# Patient Record
Sex: MALE | Race: WHITE | NOT HISPANIC OR LATINO | Employment: FULL TIME | URBAN - NONMETROPOLITAN AREA
[De-identification: names, ages, dates, MRNs, and addresses within clinical notes are randomized per-mention and may not be internally consistent; named-entity substitution may affect disease eponyms.]

---

## 2022-04-13 ENCOUNTER — OUTSIDE FACILITY SERVICE (OUTPATIENT)
Dept: CARDIOLOGY | Facility: CLINIC | Age: 32
End: 2022-04-13

## 2022-04-13 PROCEDURE — 93248 EXT ECG>7D<15D REV&INTERPJ: CPT | Performed by: INTERNAL MEDICINE

## 2022-04-19 ENCOUNTER — OFFICE VISIT (OUTPATIENT)
Dept: CARDIOLOGY | Facility: CLINIC | Age: 32
End: 2022-04-19

## 2022-04-19 VITALS
HEART RATE: 76 BPM | WEIGHT: 284 LBS | BODY MASS INDEX: 37.64 KG/M2 | SYSTOLIC BLOOD PRESSURE: 150 MMHG | HEIGHT: 73 IN | DIASTOLIC BLOOD PRESSURE: 92 MMHG

## 2022-04-19 DIAGNOSIS — R01.1 MURMUR, CARDIAC: ICD-10-CM

## 2022-04-19 DIAGNOSIS — E83.42 HYPOMAGNESEMIA: ICD-10-CM

## 2022-04-19 DIAGNOSIS — R00.0 WIDE-COMPLEX TACHYCARDIA: ICD-10-CM

## 2022-04-19 DIAGNOSIS — I10 PRIMARY HYPERTENSION: ICD-10-CM

## 2022-04-19 DIAGNOSIS — R00.2 PALPITATIONS: Primary | ICD-10-CM

## 2022-04-19 DIAGNOSIS — E88.81 METABOLIC SYNDROME: ICD-10-CM

## 2022-04-19 DIAGNOSIS — F41.0 PANIC ATTACK: ICD-10-CM

## 2022-04-19 PROBLEM — E88.810 METABOLIC SYNDROME: Status: ACTIVE | Noted: 2022-04-19

## 2022-04-19 PROCEDURE — 93000 ELECTROCARDIOGRAM COMPLETE: CPT | Performed by: INTERNAL MEDICINE

## 2022-04-19 PROCEDURE — 99204 OFFICE O/P NEW MOD 45 MIN: CPT | Performed by: INTERNAL MEDICINE

## 2022-04-19 RX ORDER — SACCHAROMYCES BOULARDII 250 MG
250 CAPSULE ORAL DAILY
COMMUNITY

## 2022-04-19 RX ORDER — CETIRIZINE HYDROCHLORIDE 10 MG/1
10 TABLET ORAL DAILY
COMMUNITY

## 2022-04-19 RX ORDER — METOPROLOL SUCCINATE 50 MG/1
50 TABLET, EXTENDED RELEASE ORAL DAILY
Qty: 30 TABLET | Refills: 11 | Status: SHIPPED | OUTPATIENT
Start: 2022-04-19 | End: 2022-11-01 | Stop reason: SDUPTHER

## 2022-04-19 RX ORDER — LOSARTAN POTASSIUM AND HYDROCHLOROTHIAZIDE 12.5; 1 MG/1; MG/1
1 TABLET ORAL DAILY
COMMUNITY
End: 2022-04-19

## 2022-04-19 NOTE — PROGRESS NOTES
Chief Complaint   Patient presents with   • Establish Care     PCP referred, palpitations, abnormal holter.    • Palpitations     Started in Dec, felt like he was having panic attacks, went to see PCP. BP elevated. Started medication that caused him to cough, changed it to losartan/ HCT. Palpitations started at the panic attack but was much better until he started on Losartan HCT, much worse since having to increase it to try to control BP. Palpitations now occurring 3-4 times a week, at times will last on and off up to 2 hrs. Worse day was the first day he wore the monitor, BP and heart rate went up  that day.    • Medication Problem     Problems sleeping and more palpitations with the Losartan, would like for it to be changed   • Cardiac history     None          CARDIAC COMPLAINTS  palpitations      Subjective   Vikash Grove is a 32 y.o. male came in today for his initial cardiac evaluation.  In December he started having symptoms suggestive of panic attacks.  He was noted to have an elevated blood pressure for which he was started on lisinopril but it made him cough.  It was replaced to losartan after which his blood pressure got little better but the palpitation started getting worse.  He is blood pressure then started going up because he was getting very anxious and the dose was increased.  Since then he started noticing increasing the frequency of the palpitation which occurs 3-4 times a week and sometimes it lasts about 2 hours.  He did wore event monitor and on the day 1 he did have 7 beats run of wide-complex tachycardia.  He is now referred for further evaluation.  He denies having any chest pain.  He does feel weak when he has the palpitation.  He denies having any loss of consciousness.  He did undergo lab work and the cholesterol is 161 with the LDL of 94.  He is not sure whether he had any other labs done at that time.  He used to drink a lot of iced tea in the past which he reduced.  He has no  "history of smoking.  He drinks occasional alcohol.  His mother has hypertension, hypercholesterolemia and some kind of valvular heart disease.    Past Surgical History:   Procedure Laterality Date   • OTHER SURGICAL HISTORY  04/13/2022    Event Monitor @ [facility]. 13 days. Avg 78. [id number]. One 7 Beats Wide complex Tachycardia       Current Outpatient Medications   Medication Sig Dispense Refill   • cetirizine (zyrTEC) 10 MG tablet Take 10 mg by mouth Daily.     • saccharomyces boulardii (FLORASTOR) 250 MG capsule Take 250 mg by mouth Daily.     • sertraline (ZOLOFT) 50 MG tablet Take 50 mg by mouth Daily.     • metoprolol succinate XL (TOPROL-XL) 50 MG 24 hr tablet Take 1 tablet by mouth Daily. 30 tablet 11     No current facility-administered medications for this visit.           ALLERGIES:  Goldenrod, Lisinopril, and Penicillins    Past Medical History:   Diagnosis Date   • Anxiety    • Hyperlipidemia    • Seasonal allergies        Social History     Tobacco Use   Smoking Status Never Smoker   Smokeless Tobacco Never Used          Family History   Problem Relation Age of Onset   • Hyperlipidemia Mother    • Hypertension Mother    • Heart disease Mother    • Heart valve disorder Mother    • Other Mother         \"hole in her heart repaired\"   • No Known Problems Father    • Other Sister         tachycardia   • Hypertension Brother    • Other Other         grandparents medical history unknown       Review of Systems   Constitutional: Positive for weight loss. Negative for decreased appetite and malaise/fatigue.   HENT: Negative for congestion and sore throat.    Eyes: Negative for blurred vision.   Cardiovascular: Positive for palpitations. Negative for chest pain.   Respiratory: Negative for shortness of breath and snoring.    Endocrine: Negative for cold intolerance and heat intolerance.   Hematologic/Lymphatic: Negative for adenopathy. Does not bruise/bleed easily.   Skin: Negative for itching, nail changes and skin " "cancer.   Musculoskeletal: Negative for arthritis and myalgias.   Gastrointestinal: Negative for abdominal pain, dysphagia and heartburn.   Genitourinary: Negative for bladder incontinence and frequency.   Neurological: Negative for dizziness, light-headedness, seizures and vertigo.   Psychiatric/Behavioral: Negative for altered mental status. The patient is nervous/anxious.    Allergic/Immunologic: Negative for environmental allergies and hives.       Diabetes- No  Thyroid- normal    Objective     /92 (BP Location: Left arm)   Pulse 76   Ht 185.4 cm (73\")   Wt 129 kg (284 lb)   BMI 37.47 kg/m²     Vitals and nursing note reviewed.   Constitutional:       Appearance: Healthy appearance. Not in distress.   Eyes:      Conjunctiva/sclera: Conjunctivae normal.      Pupils: Pupils are equal, round, and reactive to light.   HENT:      Head: Normocephalic.   Pulmonary:      Effort: Pulmonary effort is normal.      Breath sounds: Normal breath sounds.   Cardiovascular:      Normal rate. Regular rhythm.      Murmurs: There is a systolic murmur.   Abdominal:      General: Bowel sounds are normal.      Palpations: Abdomen is soft.   Musculoskeletal: Normal range of motion.      Cervical back: Normal range of motion and neck supple. Skin:     General: Skin is warm and dry.   Neurological:      Mental Status: Alert, oriented to person, place, and time and oriented to person, place and time.           ECG 12 Lead    Date/Time: 4/19/2022 3:58 PM  Performed by: Sussy Travis MD  Authorized by: Sussy Travis MD   Comparison: compared with previous ECG from 3/19/2022  Similar to previous ECG  Rhythm: sinus rhythm  Rate: normal  QRS axis: normal  Other findings: T wave abnormality and left ventricular hypertrophy    Clinical impression: non-specific ECG              Assessment/Plan   Patient's Body mass index is 37.47 kg/m². indicating that he is obese (BMI >30). Obesity-related health conditions include the " following: hypertension. Obesity is newly identified. BMI is is above average; BMI management plan is completed. We discussed low calorie, low carb based diet program, portion control and increasing exercise..     Diagnoses and all orders for this visit:    1. Palpitations (Primary)  -     metoprolol succinate XL (TOPROL-XL) 50 MG 24 hr tablet; Take 1 tablet by mouth Daily.  Dispense: 30 tablet; Refill: 11  -     Treadmill Stress Test; Future  -     CBC & Differential; Future  -     TSH; Future    2. Primary hypertension  -     metoprolol succinate XL (TOPROL-XL) 50 MG 24 hr tablet; Take 1 tablet by mouth Daily.  Dispense: 30 tablet; Refill: 11  -     Comprehensive Metabolic Panel; Future    3. Wide-complex tachycardia (HCC)  -     metoprolol succinate XL (TOPROL-XL) 50 MG 24 hr tablet; Take 1 tablet by mouth Daily.  Dispense: 30 tablet; Refill: 11  -     Treadmill Stress Test; Future  -     Adult Transthoracic Echo Complete W/ Cont if Necessary Per Protocol; Future    4. Metabolic syndrome    5. Murmur, cardiac  -     Adult Transthoracic Echo Complete W/ Cont if Necessary Per Protocol; Future    6. Hypomagnesemia  -     Magnesium; Future    7. Panic attack    At baseline his heart rate is stable.  His blood pressure is still slightly elevated.  His EKG shows normal sinus rhythm, Q waves in the anterior leads, LVH and nonspecific T wave changes in lead III and aVF.  His clinical examination reveals a BMI of 38.  He does have a soft systolic murmur at the mitral area.  He has normal peripheral pulse and no pedal edema    Regarding the palpitation, I talked to him about the Holter monitor.  I started him on Toprol-XL at 50 mg once a day.  I advised him to stop the Hyzaar for now.  I also advised him to check his electrolytes including magnesium since he is on hydrochlorothiazide.  I also advised him to check his TSH level.  After being on the Toprol-XL, I like him to undergo a regular stress test to evaluate his  functional status, chronotropic response, blood pressure response and also look for any stress-induced arrhythmia.    Regarding his hypertension, he is not able to tolerate the Hyzaar causing him to have more palpitation.  I am not sure whether it is due to the diuretic or whether he is having problems with the ARB.  We will stop it and start him on Toprol-XL.  If during the stress test he has increased chronotropic response we can increase the dose of the Toprol but if he has 1 day hypertensive response then he may need to go on a calcium channel blocker    Regarding the wide-complex tachycardia, I like to get an echocardiogram to look for any structural heart disease including LVH, LV function as well as valvular structures.  Meanwhile hopefully Toprol-XL should help controlling it    Regarding metabolic syndrome, talked to him about diet and weight reduction.  I gave him papers on Mediterranean diet    Regarding his cardiac murmur, it appears to be secondary to mitral regurgitation.  Will review the echocardiogram    Regarding his panic attacks, he is on Zoloft and apparently is doing much better.  Continue to monitor it.    Based on the results, further recommendations will be made.               Electronically signed by Sussy Travis MD April 19, 2022 15:45 EDT

## 2022-04-22 ENCOUNTER — PATIENT ROUNDING (BHMG ONLY) (OUTPATIENT)
Dept: CARDIOLOGY | Facility: CLINIC | Age: 32
End: 2022-04-22

## 2022-04-22 NOTE — PROGRESS NOTES
April 22, 2022    Hello, may I speak with Vikash Grove?    My name is Suni Hinojosa    I am  with [facility]  [facility]  [address] [place] [address] [phone].    Before we get started may I verify your date of birth? 1990    I am calling to officially welcome you to our practice and ask about your recent visit. Is this a good time to talk? yes    Tell me about your visit with us. What things went well?  everything went well       We're always looking for ways to make our patients' experiences even better. Do you have recommendations on ways we may improve?  no    Overall were you satisfied with your first visit to our practice? Yes-everyone was real nice and listened to me        I appreciate you taking the time to speak with me today. Is there anything else I can do for you? no      Thank you, and have a great day.

## 2022-05-10 ENCOUNTER — HOSPITAL ENCOUNTER (OUTPATIENT)
Dept: CARDIOLOGY | Facility: HOSPITAL | Age: 32
Discharge: HOME | End: 2022-05-10

## 2022-05-10 ENCOUNTER — LAB (OUTPATIENT)
Dept: LAB | Facility: HOSPITAL | Age: 32
End: 2022-05-10

## 2022-05-10 DIAGNOSIS — E83.42 HYPOMAGNESEMIA: ICD-10-CM

## 2022-05-10 DIAGNOSIS — R00.0 WIDE-COMPLEX TACHYCARDIA: ICD-10-CM

## 2022-05-10 DIAGNOSIS — R01.1 MURMUR, CARDIAC: ICD-10-CM

## 2022-05-10 DIAGNOSIS — R00.2 PALPITATIONS: ICD-10-CM

## 2022-05-10 DIAGNOSIS — I10 PRIMARY HYPERTENSION: ICD-10-CM

## 2022-05-10 LAB
ALBUMIN SERPL-MCNC: 4.58 G/DL (ref 3.5–5.2)
ALBUMIN/GLOB SERPL: 2.1 G/DL
ALP SERPL-CCNC: 72 U/L (ref 39–117)
ALT SERPL W P-5'-P-CCNC: 18 U/L (ref 1–41)
ANION GAP SERPL CALCULATED.3IONS-SCNC: 13.8 MMOL/L (ref 5–15)
AST SERPL-CCNC: 18 U/L (ref 1–40)
BASOPHILS # BLD AUTO: 0.08 10*3/MM3 (ref 0–0.2)
BASOPHILS NFR BLD AUTO: 0.8 % (ref 0–1.5)
BILIRUB SERPL-MCNC: 1.4 MG/DL (ref 0–1.2)
BUN SERPL-MCNC: 12 MG/DL (ref 6–20)
BUN/CREAT SERPL: 14.6 (ref 7–25)
CALCIUM SPEC-SCNC: 9.8 MG/DL (ref 8.6–10.5)
CHLORIDE SERPL-SCNC: 103 MMOL/L (ref 98–107)
CO2 SERPL-SCNC: 24.2 MMOL/L (ref 22–29)
CREAT SERPL-MCNC: 0.82 MG/DL (ref 0.76–1.27)
DEPRECATED RDW RBC AUTO: 40.3 FL (ref 37–54)
EGFRCR SERPLBLD CKD-EPI 2021: 118.2 ML/MIN/1.73
EOSINOPHIL # BLD AUTO: 0.13 10*3/MM3 (ref 0–0.4)
EOSINOPHIL NFR BLD AUTO: 1.4 % (ref 0.3–6.2)
ERYTHROCYTE [DISTWIDTH] IN BLOOD BY AUTOMATED COUNT: 12.2 % (ref 12.3–15.4)
GLOBULIN UR ELPH-MCNC: 2.2 GM/DL
GLUCOSE SERPL-MCNC: 90 MG/DL (ref 65–99)
HCT VFR BLD AUTO: 48.3 % (ref 37.5–51)
HGB BLD-MCNC: 15.8 G/DL (ref 13–17.7)
IMM GRANULOCYTES # BLD AUTO: 0.04 10*3/MM3 (ref 0–0.05)
IMM GRANULOCYTES NFR BLD AUTO: 0.4 % (ref 0–0.5)
LYMPHOCYTES # BLD AUTO: 2.18 10*3/MM3 (ref 0.7–3.1)
LYMPHOCYTES NFR BLD AUTO: 22.9 % (ref 19.6–45.3)
MAGNESIUM SERPL-MCNC: 2.1 MG/DL (ref 1.6–2.6)
MCH RBC QN AUTO: 29.3 PG (ref 26.6–33)
MCHC RBC AUTO-ENTMCNC: 32.7 G/DL (ref 31.5–35.7)
MCV RBC AUTO: 89.6 FL (ref 79–97)
MONOCYTES # BLD AUTO: 0.87 10*3/MM3 (ref 0.1–0.9)
MONOCYTES NFR BLD AUTO: 9.2 % (ref 5–12)
NEUTROPHILS NFR BLD AUTO: 6.2 10*3/MM3 (ref 1.7–7)
NEUTROPHILS NFR BLD AUTO: 65.3 % (ref 42.7–76)
NRBC BLD AUTO-RTO: 0 /100 WBC (ref 0–0.2)
PLATELET # BLD AUTO: 246 10*3/MM3 (ref 140–450)
PMV BLD AUTO: 10.8 FL (ref 6–12)
POTASSIUM SERPL-SCNC: 4.4 MMOL/L (ref 3.5–5.2)
PROT SERPL-MCNC: 6.8 G/DL (ref 6–8.5)
RBC # BLD AUTO: 5.39 10*6/MM3 (ref 4.14–5.8)
SODIUM SERPL-SCNC: 141 MMOL/L (ref 136–145)
TSH SERPL DL<=0.05 MIU/L-ACNC: 0.86 UIU/ML (ref 0.27–4.2)
WBC NRBC COR # BLD: 9.5 10*3/MM3 (ref 3.4–10.8)

## 2022-05-10 PROCEDURE — 93018 CV STRESS TEST I&R ONLY: CPT | Performed by: INTERNAL MEDICINE

## 2022-05-10 PROCEDURE — 36415 COLL VENOUS BLD VENIPUNCTURE: CPT

## 2022-05-10 PROCEDURE — 93306 TTE W/DOPPLER COMPLETE: CPT | Performed by: INTERNAL MEDICINE

## 2022-05-10 PROCEDURE — 80050 GENERAL HEALTH PANEL: CPT

## 2022-05-10 PROCEDURE — 93306 TTE W/DOPPLER COMPLETE: CPT

## 2022-05-10 PROCEDURE — 83735 ASSAY OF MAGNESIUM: CPT

## 2022-05-10 PROCEDURE — 93017 CV STRESS TEST TRACING ONLY: CPT

## 2022-05-11 LAB
BH CV ECHO MEAS - ACS: 2.3 CM
BH CV ECHO MEAS - AO MAX PG: 8.8 MMHG
BH CV ECHO MEAS - AO MEAN PG: 5.1 MMHG
BH CV ECHO MEAS - AO ROOT DIAM: 2.9 CM
BH CV ECHO MEAS - AO V2 MAX: 148 CM/SEC
BH CV ECHO MEAS - AO V2 VTI: 32.6 CM
BH CV ECHO MEAS - EDV(CUBED): 74.6 ML
BH CV ECHO MEAS - EDV(MOD-SP4): 110 ML
BH CV ECHO MEAS - EF(MOD-SP4): 54.5 %
BH CV ECHO MEAS - EF_3D-VOL: 76 %
BH CV ECHO MEAS - ESV(CUBED): 19.9 ML
BH CV ECHO MEAS - ESV(MOD-SP4): 50 ML
BH CV ECHO MEAS - FS: 35.6 %
BH CV ECHO MEAS - IVS/LVPW: 1 CM
BH CV ECHO MEAS - IVSD: 1.14 CM
BH CV ECHO MEAS - LA DIMENSION: 3.5 CM
BH CV ECHO MEAS - LAT PEAK E' VEL: 11.4 CM/SEC
BH CV ECHO MEAS - LV DIASTOLIC VOL/BSA (35-75): 44 CM2
BH CV ECHO MEAS - LV MASS(C)D: 166 GRAMS
BH CV ECHO MEAS - LV SYSTOLIC VOL/BSA (12-30): 20 CM2
BH CV ECHO MEAS - LVIDD: 4.2 CM
BH CV ECHO MEAS - LVIDS: 2.7 CM
BH CV ECHO MEAS - LVOT AREA: 3.4 CM2
BH CV ECHO MEAS - LVOT DIAM: 2.08 CM
BH CV ECHO MEAS - LVPWD: 1.14 CM
BH CV ECHO MEAS - MED PEAK E' VEL: 9.1 CM/SEC
BH CV ECHO MEAS - MV A MAX VEL: 69 CM/SEC
BH CV ECHO MEAS - MV DEC SLOPE: 440.4 CM/SEC2
BH CV ECHO MEAS - MV E MAX VEL: 106 CM/SEC
BH CV ECHO MEAS - MV E/A: 1.54
BH CV ECHO MEAS - RVDD: 3.5 CM
BH CV ECHO MEAS - SI(MOD-SP4): 24 ML/M2
BH CV ECHO MEAS - SV(MOD-SP4): 60 ML
BH CV ECHO MEASUREMENTS AVERAGE E/E' RATIO: 10.34
BH CV STRESS RECOVERY BP: NORMAL MMHG
BH CV STRESS RECOVERY HR: 99 BPM
LEFT ATRIUM VOLUME INDEX: 11.3 ML/M2
MAXIMAL PREDICTED HEART RATE: 186 BPM
MAXIMAL PREDICTED HEART RATE: 186 BPM
PERCENT MAX PREDICTED HR: 88.17 %
STRESS BASELINE BP: NORMAL MMHG
STRESS BASELINE HR: 70 BPM
STRESS PERCENT HR: 104 %
STRESS POST ESTIMATED WORKLOAD: 12.3 METS
STRESS POST EXERCISE DUR MIN: 9 MIN
STRESS POST EXERCISE DUR SEC: 40 SEC
STRESS POST PEAK BP: NORMAL MMHG
STRESS POST PEAK HR: 164 BPM
STRESS TARGET HR: 158 BPM
STRESS TARGET HR: 158 BPM

## 2022-05-13 RX ORDER — AMLODIPINE BESYLATE 5 MG/1
5 TABLET ORAL DAILY
Qty: 30 TABLET | Refills: 11 | Status: SHIPPED | OUTPATIENT
Start: 2022-05-13 | End: 2022-11-01 | Stop reason: SDUPTHER

## 2022-05-13 NOTE — TELEPHONE ENCOUNTER
----- Message from Kristi Whittaker LPN sent at 5/12/2022  8:21 AM EDT -----    ----- Message -----  From: Sussy Travis MD  Sent: 5/11/2022   2:01 PM EDT  To: Alejandra Darby RN    Norvasc    
Patient was made aware of results of stress, echo, and labs. Patient was made aware to add amlodipine 5 mg daily.     Script is pended to be sent to [facility] in [place].   
no

## 2022-11-01 ENCOUNTER — OFFICE VISIT (OUTPATIENT)
Dept: CARDIOLOGY | Facility: CLINIC | Age: 32
End: 2022-11-01

## 2022-11-01 VITALS
WEIGHT: 302.6 LBS | BODY MASS INDEX: 40.11 KG/M2 | HEART RATE: 78 BPM | DIASTOLIC BLOOD PRESSURE: 84 MMHG | SYSTOLIC BLOOD PRESSURE: 132 MMHG | HEIGHT: 73 IN

## 2022-11-01 DIAGNOSIS — R00.2 PALPITATIONS: ICD-10-CM

## 2022-11-01 DIAGNOSIS — E66.01 SEVERE OBESITY (BMI 35.0-39.9) WITH COMORBIDITY: Primary | ICD-10-CM

## 2022-11-01 DIAGNOSIS — I10 PRIMARY HYPERTENSION: ICD-10-CM

## 2022-11-01 DIAGNOSIS — R00.0 WIDE-COMPLEX TACHYCARDIA: ICD-10-CM

## 2022-11-01 PROCEDURE — 99213 OFFICE O/P EST LOW 20 MIN: CPT | Performed by: NURSE PRACTITIONER

## 2022-11-01 RX ORDER — METOPROLOL SUCCINATE 50 MG/1
50 TABLET, EXTENDED RELEASE ORAL DAILY
Qty: 90 TABLET | Refills: 2 | Status: SHIPPED | OUTPATIENT
Start: 2022-11-01

## 2022-11-01 RX ORDER — AMLODIPINE BESYLATE 5 MG/1
5 TABLET ORAL DAILY
Qty: 90 TABLET | Refills: 2 | Status: SHIPPED | OUTPATIENT
Start: 2022-11-01

## 2022-11-01 NOTE — PROGRESS NOTES
Chief Complaint   Patient presents with   • Follow-up     Pt is here for cardiac follow up.  He denies CP, SOB, dizziness or palpitations. He does not take a daily ASA.   • Med Refill     Pt request 90 day refills to be sent to [facility] in [place].  Medications were verified by med bottles.     • Lab Work     Pt states their last labs were in September for his work.         Cardiac Complaints  none      Subjective   Vikash Grove is a 32 y.o. male with HTN, wide complex tachycardia per event monitor, and anxiety. He was referred in April 2022 for palpitations. Cardiac workup with stress and echo were recommended. Stress was negative for ischemia, with HTN response noted, norvasc added. Echo  Revealed normal LV function and trace MR.    He comes today for follow up and denies new concerns. No CP, SOA, dizziness, or palpitations reported. Labs done in September at work, no current available, he reports he was told all was oky. Refills requested for 90 day supply.            Cardiac History  Past Surgical History:   Procedure Laterality Date   • CARDIOVASCULAR STRESS TEST  05/10/2022    R.Stress- 9 Min,40 Sec. 12.3 METS.88% THR, BP- 201/67. Negative   • ECHO - CONVERTED  05/10/2022    TLS- EF 65%. Trace-Mild MR   • OTHER SURGICAL HISTORY  04/13/2022    Event Monitor @ [facility]. 13 days. Avg 78. . One 7 Beats Wide complex Tachycardia       Current Outpatient Medications   Medication Sig Dispense Refill   • amLODIPine (NORVASC) 5 MG tablet Take 1 tablet by mouth Daily. 90 tablet 2   • cetirizine (zyrTEC) 10 MG tablet Take 10 mg by mouth Daily.     • metoprolol succinate XL (TOPROL-XL) 50 MG 24 hr tablet Take 1 tablet by mouth Daily. 90 tablet 2   • saccharomyces boulardii (FLORASTOR) 250 MG capsule Take 250 mg by mouth Daily.     • sertraline (ZOLOFT) 50 MG tablet Take 50 mg by mouth Daily.       No current facility-administered medications for this visit.       Goldenrod, Lisinopril, and Penicillins    Past  "Medical History:   Diagnosis Date   • Anxiety    • Hyperlipidemia    • Hypertension    • Seasonal allergies        Social History     Socioeconomic History   • Marital status: Single   Tobacco Use   • Smoking status: Never   • Smokeless tobacco: Never   Vaping Use   • Vaping Use: Never used   Substance and Sexual Activity   • Alcohol use: Not Currently     Comment: 1 beer a month   • Drug use: Never   • Sexual activity: Not Currently     Partners: Female     Birth control/protection: Condom       Family History   Problem Relation Age of Onset   • Hyperlipidemia Mother    • Hypertension Mother    • Heart disease Mother    • Heart valve disorder Mother    • Other Mother         \"hole in her heart repaired\"   • No Known Problems Father    • Other Sister         tachycardia   • Hypertension Brother    • Other Other         grandparents medical history unknown       Review of Systems   Constitutional: Negative for malaise/fatigue and night sweats.   Cardiovascular: Negative for chest pain, claudication, dyspnea on exertion, irregular heartbeat, leg swelling, near-syncope, orthopnea and palpitations.   Respiratory: Negative for cough, shortness of breath and wheezing.    Musculoskeletal: Positive for stiffness. Negative for back pain and joint pain.   Gastrointestinal: Negative for anorexia, heartburn, nausea and vomiting.   Genitourinary: Negative for dysuria, hematuria, hesitancy and nocturia.   Neurological: Negative for dizziness, headaches and light-headedness.   Psychiatric/Behavioral: Negative for depression and memory loss. The patient is not nervous/anxious.            Objective     /84 (BP Location: Left arm, Patient Position: Sitting)   Pulse 78   Ht 185.4 cm (73\")   Wt (!) 137 kg (302 lb 9.6 oz)   BMI 39.92 kg/m²     Constitutional:       Appearance: Not in distress.   Eyes:      Pupils: Pupils are equal, round, and reactive to light.   HENT:      Nose: Nose normal.   Pulmonary:      Effort: " Pulmonary effort is normal.      Breath sounds: Normal breath sounds.   Cardiovascular:      PMI at left midclavicular line. Normal rate. Regular rhythm.      Murmurs: There is a systolic murmur.   Abdominal:      Palpations: Abdomen is soft.   Musculoskeletal: Normal range of motion.      Cervical back: Normal range of motion and neck supple. Skin:     General: Skin is warm and dry.   Neurological:      Mental Status: Alert and oriented to person, place and time.         Procedures         Diagnoses and all orders for this visit:    1. Severe obesity (BMI 35.0-39.9) with comorbidity (HCC) (Primary)    2. Palpitations  -     metoprolol succinate XL (TOPROL-XL) 50 MG 24 hr tablet; Take 1 tablet by mouth Daily.  Dispense: 90 tablet; Refill: 2    3. Primary hypertension  -     metoprolol succinate XL (TOPROL-XL) 50 MG 24 hr tablet; Take 1 tablet by mouth Daily.  Dispense: 90 tablet; Refill: 2    4. Wide-complex tachycardia  -     metoprolol succinate XL (TOPROL-XL) 50 MG 24 hr tablet; Take 1 tablet by mouth Daily.  Dispense: 90 tablet; Refill: 2    Other orders  -     amLODIPine (NORVASC) 5 MG tablet; Take 1 tablet by mouth Daily.  Dispense: 90 tablet; Refill: 2             Palpitations: Well managed on current BB therapy. No change to current toprol XL dosing will be advised. Limited caffeine intake urged.     HTN: BP is stable today with addition of norvasc therapy. Same norvasc and toprol continued. Limited sodium diet urged.     Cardiac status: stable. Most recent workup showed no ischemic burden per EKG and echo showed no sig valve concerns.    BMI has increased, with weight gain noted. Patient urged on good cardiac diet with limited carbs, calories, and walking regimen advised.     Refills per request.    Yearly follow up advised, or sooner if needed. Patient to call with concerns.               Problems Addressed this Visit        Cardiac and Vasculature    Wide-complex tachycardia    Relevant Medications     metoprolol succinate XL (TOPROL-XL) 50 MG 24 hr tablet    Primary hypertension    Relevant Medications    amLODIPine (NORVASC) 5 MG tablet    metoprolol succinate XL (TOPROL-XL) 50 MG 24 hr tablet    Palpitations    Relevant Medications    metoprolol succinate XL (TOPROL-XL) 50 MG 24 hr tablet   Other Visit Diagnoses     Severe obesity (BMI 35.0-39.9) with comorbidity (HCC)    -  Primary      Diagnoses       Codes Comments    Severe obesity (BMI 35.0-39.9) with comorbidity (HCC)    -  Primary ICD-10-CM: E66.01  ICD-9-CM: 278.01     Palpitations     ICD-10-CM: R00.2  ICD-9-CM: 785.1     Primary hypertension     ICD-10-CM: I10  ICD-9-CM: 401.9     Wide-complex tachycardia     ICD-10-CM: R00.0  ICD-9-CM: 785.0                     Electronically signed by ELLEN Lainez November 1, 2022 11:58 EDT

## 2023-05-08 RX ORDER — AMLODIPINE BESYLATE 5 MG/1
TABLET ORAL
Qty: 30 TABLET | Refills: 8 | Status: SHIPPED | OUTPATIENT
Start: 2023-05-08

## 2023-11-02 ENCOUNTER — OFFICE VISIT (OUTPATIENT)
Dept: CARDIOLOGY | Facility: CLINIC | Age: 33
End: 2023-11-02
Payer: COMMERCIAL

## 2023-11-02 VITALS
HEIGHT: 73 IN | DIASTOLIC BLOOD PRESSURE: 90 MMHG | SYSTOLIC BLOOD PRESSURE: 160 MMHG | HEART RATE: 78 BPM | BODY MASS INDEX: 38.91 KG/M2 | WEIGHT: 293.6 LBS

## 2023-11-02 DIAGNOSIS — R00.2 PALPITATIONS: ICD-10-CM

## 2023-11-02 DIAGNOSIS — E66.01 SEVERE OBESITY (BMI 35.0-39.9) WITH COMORBIDITY: ICD-10-CM

## 2023-11-02 DIAGNOSIS — I10 PRIMARY HYPERTENSION: Primary | ICD-10-CM

## 2023-11-02 DIAGNOSIS — F41.9 ANXIETY: ICD-10-CM

## 2023-11-02 DIAGNOSIS — R00.0 WIDE-COMPLEX TACHYCARDIA: ICD-10-CM

## 2023-11-02 RX ORDER — AMLODIPINE BESYLATE 5 MG/1
5 TABLET ORAL DAILY
Qty: 90 TABLET | Refills: 2 | Status: SHIPPED | OUTPATIENT
Start: 2023-11-02

## 2023-11-02 RX ORDER — METOPROLOL SUCCINATE 50 MG/1
50 TABLET, EXTENDED RELEASE ORAL 2 TIMES DAILY
Qty: 180 TABLET | Refills: 2 | Status: SHIPPED | OUTPATIENT
Start: 2023-11-02

## 2023-11-02 NOTE — PROGRESS NOTES
Chief Complaint   Patient presents with    Follow-up     Pt is here for cardiac follow up.  Pt states he does have some CP, palpitations and some rare dizziness.  He states his anxiety  has increased since increasing his metoprolol in July.  He did stop zoloft in March.  He has talked to his PCP about starting back on a different SSRI.  He has also started taking a magnesium supplement.  Pt does not take a daily ASA.      Med Refill     Pt request 90 day refills to be sent to  [facility] in [place].  Medications were verified by med bottles.      Lab Work     Pt states their last labs were a few weeks ago with his PCP.         Cardiac Complaints  palpitations      Subjective   Vikash Grove is a 33 y.o. male with HTN, wide complex tachycardia per event monitor, and anxiety. He was referred in April 2022 for palpitations. Cardiac workup with stress and echo were recommended. Stress was negative for ischemia, with HTN response noted, norvasc added. Echo  Revealed normal LV function and trace MR.     He comes today for follow up and reports some sharp pain in his chest that he describes at random, palpitations, and rare dizziness. He admits this has worsened since he stopped his zoloft in March. He states his anxiety has markedly increased, but has not discussed new SSRI with PCP yet. Labs a few weeks ago with PCP. Refills requested.          Cardiac History  Past Surgical History:   Procedure Laterality Date    CARDIOVASCULAR STRESS TEST  05/10/2022    R.Stress- 9 Min,40 Sec. 12.3 METS.88% THR, BP- 201/67. Negative    CONVERTED (HISTORICAL) HOLTER  07/19/2023    < 5 Days. Avg 71. . 2 SVT    ECHO - CONVERTED  05/10/2022    TLS- EF 65%. Trace-Mild MR    OTHER SURGICAL HISTORY  04/13/2022    Event Monitor @ [facility]. 13 days. Avg 78. . One 7 Beats Wide complex Tachycardia       Current Outpatient Medications   Medication Sig Dispense Refill    amLODIPine (NORVASC) 5 MG tablet Take 1 tablet by mouth Daily. 90  "tablet 2    cetirizine (zyrTEC) 10 MG tablet Take 1 tablet by mouth Daily.      MAGNESIUM GLYCINATE PLUS PO Take  by mouth.      saccharomyces boulardii (FLORASTOR) 250 MG capsule Take 1 capsule by mouth Daily.      metoprolol succinate XL (TOPROL-XL) 50 MG 24 hr tablet Take 1 tablet by mouth 2 (Two) Times a Day. 180 tablet 2     No current facility-administered medications for this visit.       Goldenrod, Lisinopril, and Penicillins    Past Medical History:   Diagnosis Date    Anxiety     Hyperlipidemia     Hypertension     Seasonal allergies        Social History     Socioeconomic History    Marital status: Single   Tobacco Use    Smoking status: Never    Smokeless tobacco: Never   Vaping Use    Vaping Use: Never used   Substance and Sexual Activity    Alcohol use: Not Currently     Comment: 1 beer a month    Drug use: Never    Sexual activity: Not Currently     Partners: Female     Birth control/protection: Condom       Family History   Problem Relation Age of Onset    Hyperlipidemia Mother     Hypertension Mother     Heart disease Mother     Heart valve disorder Mother     Other Mother         \"hole in her heart repaired\"    No Known Problems Father     Other Sister         tachycardia    Hypertension Brother     Other Other         grandparents medical history unknown       Review of Systems   Constitutional: Negative for malaise/fatigue and night sweats.   Cardiovascular:  Positive for palpitations. Negative for chest pain, claudication, dyspnea on exertion, irregular heartbeat, leg swelling, near-syncope, orthopnea and syncope.   Respiratory:  Negative for cough, shortness of breath and wheezing.    Musculoskeletal:  Negative for back pain, joint pain and stiffness.   Gastrointestinal:  Negative for anorexia, heartburn, nausea and vomiting.   Genitourinary:  Negative for dysuria, hematuria, hesitancy and nocturia.   Neurological:  Negative for dizziness, headaches and light-headedness. " "  Psychiatric/Behavioral:  Negative for depression and memory loss. The patient is not nervous/anxious.            Objective     /90 (BP Location: Left arm, Patient Position: Sitting)   Pulse 78   Ht 185.4 cm (73\")   Wt 133 kg (293 lb 9.6 oz)   BMI 38.74 kg/m²     Constitutional:       Appearance: Not in distress.   Eyes:      Pupils: Pupils are equal, round, and reactive to light.   HENT:      Nose: Nose normal.   Pulmonary:      Effort: Pulmonary effort is normal.      Breath sounds: Normal breath sounds.   Cardiovascular:      PMI at left midclavicular line. Normal rate. Regular rhythm.      Murmurs: There is a systolic murmur.   Abdominal:      Palpations: Abdomen is soft.   Musculoskeletal: Normal range of motion.      Cervical back: Normal range of motion and neck supple. Skin:     General: Skin is warm and dry.   Neurological:      Mental Status: Alert.         Procedures         Diagnoses and all orders for this visit:    1. Primary hypertension (Primary)    2. Palpitations    3. Wide-complex tachycardia    4. Anxiety    5. Severe obesity (BMI 35.0-39.9) with comorbidity    Other orders  -     metoprolol succinate XL (TOPROL-XL) 50 MG 24 hr tablet; Take 1 tablet by mouth 2 (Two) Times a Day.  Dispense: 180 tablet; Refill: 2  -     amLODIPine (NORVASC) 5 MG tablet; Take 1 tablet by mouth Daily.  Dispense: 90 tablet; Refill: 2             Palpitations: Noted, but he admits he has been holding nightly metoprolol. Will be urged to restart. If still noted, will increase PM toprol to 50mg.     HTN: BP is stable today with addition of norvasc therapy. Same norvasc and toprol continued. Limited sodium diet urged.     Anxiety: Noted today, now off zoloft, will discuss further with you.     Cardiac status: Stable. No new stress/echo urged.     BMI noted at 38.74, good cardiac diet with limited carb, calories, and walking regimen urged.     Refills per request.     FU will be 6 months, urged to call with " concerns.            Problems Addressed this Visit          Cardiac and Vasculature    Wide-complex tachycardia    Primary hypertension - Primary    Relevant Medications    metoprolol succinate XL (TOPROL-XL) 50 MG 24 hr tablet    amLODIPine (NORVASC) 5 MG tablet    Palpitations       Mental Health    Panic attack     Other Visit Diagnoses       Severe obesity (BMI 35.0-39.9) with comorbidity              Diagnoses         Codes Comments    Primary hypertension    -  Primary ICD-10-CM: I10  ICD-9-CM: 401.9     Palpitations     ICD-10-CM: R00.2  ICD-9-CM: 785.1     Wide-complex tachycardia     ICD-10-CM: R00.0  ICD-9-CM: 785.0     Anxiety     ICD-10-CM: F41.9  ICD-9-CM: 300.00     Severe obesity (BMI 35.0-39.9) with comorbidity     ICD-10-CM: E66.01  ICD-9-CM: 278.01                             Electronically signed by ELLEN Lainez November 2, 2023 16:07 EDT

## 2024-01-08 RX ORDER — METOPROLOL SUCCINATE 50 MG/1
50 TABLET, EXTENDED RELEASE ORAL DAILY
Qty: 90 TABLET | OUTPATIENT
Start: 2024-01-08

## 2024-01-08 NOTE — TELEPHONE ENCOUNTER
PATIENT CALLING TO CHECK ON THE STATUS OF THIS. HE IS COMPLETELY OUT. PATIENT REPORTS THAT HE WILL ALSO NEED A REFILL OF THE METOPROLOL 25MG AS WELL.

## 2024-01-08 NOTE — TELEPHONE ENCOUNTER
Spoke with pt, med was sent 11/7/23 for 90 day with 2 refills.  He is going to check with his pharmacy.

## 2024-08-05 ENCOUNTER — TELEPHONE (OUTPATIENT)
Dept: CARDIOLOGY | Facility: CLINIC | Age: 34
End: 2024-08-05
Payer: COMMERCIAL

## 2024-08-05 RX ORDER — AMLODIPINE BESYLATE 5 MG/1
5 TABLET ORAL DAILY
Qty: 30 TABLET | Refills: 0 | Status: SHIPPED | OUTPATIENT
Start: 2024-08-05

## 2024-08-05 NOTE — TELEPHONE ENCOUNTER
Patient called.  Pharmacy gave him 3 pills of his Norvasc for over the weekend.  He does not have any for today.  He has follow up appointment tomorrow with ELLEN Santoyo.  Script sent to [facility].

## 2024-08-06 ENCOUNTER — OFFICE VISIT (OUTPATIENT)
Dept: CARDIOLOGY | Facility: CLINIC | Age: 34
End: 2024-08-06
Payer: COMMERCIAL

## 2024-08-06 VITALS
WEIGHT: 309 LBS | SYSTOLIC BLOOD PRESSURE: 144 MMHG | OXYGEN SATURATION: 98 % | HEIGHT: 73 IN | BODY MASS INDEX: 40.95 KG/M2 | DIASTOLIC BLOOD PRESSURE: 82 MMHG | HEART RATE: 84 BPM

## 2024-08-06 DIAGNOSIS — F41.9 ANXIETY: ICD-10-CM

## 2024-08-06 DIAGNOSIS — I10 PRIMARY HYPERTENSION: Primary | ICD-10-CM

## 2024-08-06 DIAGNOSIS — E66.01 MORBID OBESITY WITH BMI OF 40.0-44.9, ADULT: ICD-10-CM

## 2024-08-06 DIAGNOSIS — R00.2 PALPITATIONS: ICD-10-CM

## 2024-08-06 PROCEDURE — 99214 OFFICE O/P EST MOD 30 MIN: CPT | Performed by: NURSE PRACTITIONER

## 2024-08-06 RX ORDER — CHOLECALCIFEROL (VITAMIN D3) 25 MCG
1000 TABLET ORAL DAILY
COMMUNITY

## 2024-08-06 RX ORDER — DESVENLAFAXINE SUCCINATE 50 MG/1
TABLET, EXTENDED RELEASE ORAL
COMMUNITY
Start: 2024-06-03

## 2024-08-06 RX ORDER — METOPROLOL SUCCINATE 50 MG/1
50 TABLET, EXTENDED RELEASE ORAL 2 TIMES DAILY
Qty: 180 TABLET | Refills: 2 | Status: SHIPPED | OUTPATIENT
Start: 2024-08-06

## 2024-08-06 RX ORDER — LANOLIN ALCOHOL/MO/W.PET/CERES
1000 CREAM (GRAM) TOPICAL DAILY
COMMUNITY

## 2024-08-06 NOTE — PROGRESS NOTES
Chief Complaint   Patient presents with    Follow-up     Pt is here for cardiac follow up. Denies CP, SOB, and palpations/dizziness      Med Refill     Pt request 90 day refills to be sent to [facility] pharmacy in [place] .  Medications were verified by pt      lab work     Pt states their last labs were in the last 3 months with PCP.         Cardiac Complaints  none      Subjective   Vikash Grove is a 34 y.o. male with HTN, wide complex tachycardia per event monitor, and anxiety. He was referred in April 2022 for palpitations. Cardiac workup with stress and echo were recommended. Stress was negative for ischemia, with HTN response noted, norvasc added. Echo  Revealed normal LV function and trace MR.     He comes today for follow up and denies any new concerns. No CP, SOA, palpitations, dizziness, or syncope noted. Labs with PCP, checked about 3 months ago, no current available. He does admit that vitamin B and D were low and are being replaced. Refills requested for 90 day supply.            Cardiac History  Past Surgical History:   Procedure Laterality Date    CARDIOVASCULAR STRESS TEST  05/10/2022    R.Stress- 9 Min,40 Sec. 12.3 METS.88% THR, BP- 201/67. Negative    CONVERTED (HISTORICAL) HOLTER  07/19/2023    < 5 Days. Avg 71. . 2 SVT    ECHO - CONVERTED  05/10/2022    TLS- EF 65%. Trace-Mild MR    OTHER SURGICAL HISTORY  04/13/2022    Event Monitor @ [facility]. 13 days. Avg 78. . One 7 Beats Wide complex Tachycardia       Current Outpatient Medications   Medication Sig Dispense Refill    amLODIPine (NORVASC) 5 MG tablet TAKE 1 TABLET BY MOUTH DAILY 30 tablet 0    Cholecalciferol 25 MCG (1000 UT) tablet Take 1 tablet by mouth Daily.      desvenlafaxine (PRISTIQ) 50 MG 24 hr tablet       MAGNESIUM GLYCINATE PLUS PO Take  by mouth.      metoprolol succinate XL (TOPROL-XL) 50 MG 24 hr tablet Take 1 tablet by mouth 2 (Two) Times a Day. 180 tablet 2    saccharomyces boulardii (FLORASTOR) 250 MG capsule  "Take 1 capsule by mouth Daily.      vitamin B-12 (CYANOCOBALAMIN) 1000 MCG tablet Take 1 tablet by mouth Daily.      cetirizine (zyrTEC) 10 MG tablet Take 1 tablet by mouth Daily. (Patient not taking: Reported on 8/6/2024)       No current facility-administered medications for this visit.       Goldenrod, Lisinopril, and Penicillins    Past Medical History:   Diagnosis Date    Anxiety     Hyperlipidemia     Hypertension     Seasonal allergies        Social History     Socioeconomic History    Marital status: Single   Tobacco Use    Smoking status: Never    Smokeless tobacco: Never   Vaping Use    Vaping status: Never Used   Substance and Sexual Activity    Alcohol use: Not Currently     Comment: 1 beer a month    Drug use: Never    Sexual activity: Not Currently     Partners: Female     Birth control/protection: Condom       Family History   Problem Relation Age of Onset    Hyperlipidemia Mother     Hypertension Mother     Heart disease Mother     Heart valve disorder Mother     Other Mother         \"hole in her heart repaired\"    No Known Problems Father     Other Sister         tachycardia    Hypertension Brother     Other Other         grandparents medical history unknown       Review of Systems   Constitutional: Negative for malaise/fatigue and night sweats.   Cardiovascular:  Negative for chest pain, claudication, dyspnea on exertion, irregular heartbeat, leg swelling, near-syncope, orthopnea, palpitations and syncope.   Respiratory:  Negative for cough, shortness of breath and wheezing.    Musculoskeletal:  Negative for back pain, joint pain and stiffness.   Gastrointestinal:  Negative for anorexia, heartburn, nausea and vomiting.   Genitourinary:  Negative for dysuria, hematuria, hesitancy and nocturia.   Neurological:  Negative for dizziness, light-headedness and loss of balance.   Psychiatric/Behavioral:  Negative for depression and memory loss. The patient is not nervous/anxious.            Objective " "    /82   Pulse 84   Ht 185.4 cm (73\")   Wt (!) 140 kg (309 lb)   SpO2 98%   BMI 40.77 kg/m²     Constitutional:       Appearance: Not in distress.   Eyes:      Pupils: Pupils are equal, round, and reactive to light.   HENT:      Nose: Nose normal.   Pulmonary:      Effort: Pulmonary effort is normal.      Breath sounds: Normal breath sounds.   Cardiovascular:      PMI at left midclavicular line. Normal rate. Regular rhythm.      Murmurs: There is a systolic murmur.   Abdominal:      Palpations: Abdomen is soft.   Musculoskeletal: Normal range of motion.      Cervical back: Normal range of motion and neck supple. Skin:     General: Skin is warm and dry.   Neurological:      Mental Status: Alert.         Procedures         Diagnoses and all orders for this visit:    1. Primary hypertension (Primary)    2. Palpitations    3. Anxiety    4. Morbid obesity with BMI of 40.0-44.9, adult    Other orders  -     metoprolol succinate XL (TOPROL-XL) 50 MG 24 hr tablet; Take 1 tablet by mouth 2 (Two) Times a Day.  Dispense: 180 tablet; Refill: 2             Palpitations: Denied. Continue toprol at 50mg BID. Limited caffeine urged.    HTN: BP is stable today but mild elevation noted, just took meds before appointment. Has been well managed with addition of norvasc therapy. He admits it has been about 120s at home. Same norvasc and toprol continued. Limited sodium diet urged.      Anxiety: Improved on Pristiq therapy. Will continue same, followed by your office.     Cardiac status: Stable. No new stress/echo urged.     BMI noted at 40.77, good cardiac diet with limited carb, calories, and walking regimen urged.     Refills per request.     FU will be 6 months, urged to call with concerns.           Problems Addressed this Visit          Cardiac and Vasculature    Primary hypertension - Primary    Relevant Medications    metoprolol succinate XL (TOPROL-XL) 50 MG 24 hr tablet    Palpitations     Other Visit Diagnoses  "      Anxiety        Morbid obesity with BMI of 40.0-44.9, adult              Diagnoses         Codes Comments    Primary hypertension    -  Primary ICD-10-CM: I10  ICD-9-CM: 401.9     Palpitations     ICD-10-CM: R00.2  ICD-9-CM: 785.1     Anxiety     ICD-10-CM: F41.9  ICD-9-CM: 300.00     Morbid obesity with BMI of 40.0-44.9, adult     ICD-10-CM: E66.01, Z68.41  ICD-9-CM: 278.01, V85.41                     Electronically signed by Halie Pablo, APRN August 6, 2024 11:11 EDT

## 2024-09-03 NOTE — TELEPHONE ENCOUNTER
Caller: Vikash Grove    Relationship: Self    Best call back number: [phone]     What is the best time to reach you: ANYTIME     What was the call regarding: CHECKING ON THE STATUS OF THIS MED REFILL, STATES HE HAS 1 PILL LEFT. PLEASE ADVISE WHEN THIS HAS BEEN APPROVED.     Is it okay if the provider responds through MyChart: EITHER

## 2024-09-04 RX ORDER — AMLODIPINE BESYLATE 5 MG/1
5 TABLET ORAL DAILY
Qty: 90 TABLET | Refills: 1 | Status: SHIPPED | OUTPATIENT
Start: 2024-09-04

## 2024-09-04 NOTE — TELEPHONE ENCOUNTER
Caller: [facility] [id number] - [place], [place] - [address] [facility] [address] [facility] [address] - [phone]  - [phone] FX    Relationship: Pharmacy    Best call back number: [phone]    Requested Prescriptions:   Requested Prescriptions     Pending Prescriptions Disp Refills    amLODIPine (NORVASC) 5 MG tablet 90 tablet 1     Sig: Take 1 tablet by mouth Daily.        Pharmacy where request should be sent: [facility] [id number] - [place], [place] - [address] [facility] [address] [facility] [address] - [phone] [facility] [phone] FX     Last office visit with prescribing clinician: 8/6/2024   Last telemedicine visit with prescribing clinician: Visit date not found   Next office visit with prescribing clinician: 2/12/2025     Additional details provided by patient:     Does the patient have less than a 3 day supply:  [x] Yes  [] No    Would you like a call back once the refill request has been completed: [] Yes [x] No    If the office needs to give you a call back, can they leave a voicemail: [] Yes [x] No    Amarilys Yi Rep   09/04/24 12:44 EDT

## 2024-09-05 RX ORDER — AMLODIPINE BESYLATE 5 MG/1
5 TABLET ORAL DAILY
Qty: 90 TABLET | Refills: 1 | OUTPATIENT
Start: 2024-09-05